# Patient Record
(demographics unavailable — no encounter records)

---

## 2025-04-02 NOTE — PHYSICAL EXAM
[Well Developed] : well developed [Well Nourished] : well nourished [No Acute Distress] : no acute distress [Normal Conjunctiva] : normal conjunctiva [Normal Venous Pressure] : normal venous pressure [No Carotid Bruit] : no carotid bruit [Normal S1, S2] : normal S1, S2 [No Murmur] : no murmur [No Rub] : no rub [No Gallop] : no gallop [Clear Lung Fields] : clear lung fields [Good Air Entry] : good air entry [No Respiratory Distress] : no respiratory distress  [No Masses/organomegaly] : no masses/organomegaly [No Edema] : no edema [No Cyanosis] : no cyanosis [No Clubbing] : no clubbing [Moves all extremities] : moves all extremities [No Focal Deficits] : no focal deficits [Normal Speech] : normal speech [Alert and Oriented] : alert and oriented [Normal memory] : normal memory

## 2025-04-02 NOTE — ASSESSMENT
[FreeTextEntry1] : 38 yo male with HIV (on Truvada) and intermittent chest discomfort over the past 4 months, which he describes as pressure-like and occurs randomly.  ECG today demonstrated sinus rhythm.  Given intermittent non-exertional CP, will perform echocardiogram to assess LV function and structural heart disease. After review of echo results, will determine if further cardiac work-up or intervention is clinically indicated. Will defer stress test at this time given lack of exertional complaints.

## 2025-04-02 NOTE — HISTORY OF PRESENT ILLNESS
[FreeTextEntry1] : 36 yo male with HIV (on Truvada), who presents today for cardiac evaluation of intermittent chest discomfort over the past 4 months, which he describes as pressure-like and occurs randomly. The chest pain last up to 5 minutes before resolving. He does not exercise regularly. Patient denies dyspnea, palpitations, syncope, edema, melena, hematochezia, or hematemesis.

## 2025-04-02 NOTE — REVIEW OF SYSTEMS
[Chest Discomfort] : chest discomfort [Negative] : Heme/Lymph [Dyspnea on exertion] : not dyspnea during exertion [Lower Ext Edema] : no extremity edema [Leg Claudication] : no intermittent leg claudication [Palpitations] : no palpitations [Orthopnea] : no orthopnea [Syncope] : no syncope

## 2025-04-02 NOTE — ASSESSMENT
Called patient no answer left message asking him to call the office back asap in reference to his appt with Dr. Latoya Carrion at 11:30 AM today. [FreeTextEntry1] : 36 yo male with HIV (on Truvada) and intermittent chest discomfort over the past 4 months, which he describes as pressure-like and occurs randomly.  ECG today demonstrated sinus rhythm.  Given intermittent non-exertional CP, will perform echocardiogram to assess LV function and structural heart disease. After review of echo results, will determine if further cardiac work-up or intervention is clinically indicated. Will defer stress test at this time given lack of exertional complaints.

## 2025-04-23 NOTE — PHYSICAL EXAM
[Alert] : alert [Sclera] : the sclera and conjunctiva were normal [No Respiratory Distress] : no respiratory distress [None] : no edema [Abdomen Soft] : soft [Abnormal Walk] : normal gait [Normal Color / Pigmentation] : normal skin color and pigmentation [No Focal Deficits] : no focal deficits [Oriented To Time, Place, And Person] : oriented to person, place, and time

## 2025-04-23 NOTE — CONSULT LETTER
[Dear  ___] : Dear  [unfilled], [Consult Letter:] : I had the pleasure of evaluating your patient, [unfilled]. [Please see my note below.] : Please see my note below. [Consult Closing:] : Thank you very much for allowing me to participate in the care of this patient.  If you have any questions, please do not hesitate to contact me. [Sincerely,] : Sincerely, [FreeTextEntry3] : Trevon Chacko MD tel: [phone] fax: [phone]

## 2025-04-23 NOTE — HISTORY OF PRESENT ILLNESS
[FreeTextEntry1] : SPENSER MARIE  is being evaluated at the request of Dr. Berger  for an opinion re: epigastric pain. Relieved with prn Omeprazole.  Denies nausea, vomiting, fever, chills, diarrhea, constipation, melena, hematemesis, BRBPR  Saw SAL Kelly in 10/2023 for epigastric pain. Stool sent for H. pylori was positive ( unclear if patient was treated) .  Sonogram was c/w fatty liver. Labs notable for isolated Alk phos elevation to 143

## 2025-04-23 NOTE — ASSESSMENT
[FreeTextEntry1] : Epigastric pain / history of H. pylori:  EGD planned. Omeprazole prescribed  Pertinent available records reviewed Risks of the procedure including but not limited to bleeding / perforation / infection / anesthesia complication / missed polyp or lesion explained to the  patient . The patient expressed understanding and a desire to proceed with the procedure.  Risk of not doing procedure includes but is not limited to missed or delayed diagnosis of gastrointestinal pathology.

## 2025-04-23 NOTE — CONSULT LETTER
[Dear  ___] : Dear  [unfilled], [Consult Letter:] : I had the pleasure of evaluating your patient, [unfilled]. [Please see my note below.] : Please see my note below. [Consult Closing:] : Thank you very much for allowing me to participate in the care of this patient.  If you have any questions, please do not hesitate to contact me. [Sincerely,] : Sincerely, [FreeTextEntry3] : Trevon Chacko MD tel: [phone] fax: [phone]

## 2025-04-23 NOTE — PHYSICAL EXAM
Spoke w/ pt mom and offered appt for today. Mom accepted    [Alert] : alert [Sclera] : the sclera and conjunctiva were normal [No Respiratory Distress] : no respiratory distress [None] : no edema [Abdomen Soft] : soft [Abnormal Walk] : normal gait [Normal Color / Pigmentation] : normal skin color and pigmentation [No Focal Deficits] : no focal deficits [Oriented To Time, Place, And Person] : oriented to person, place, and time

## 2025-05-05 NOTE — PHYSICAL EXAM
[FreeTextEntry1] : Physical examination  General: No acute distress, Awake, Alert.   Mental status  Awake, alert, and oriented to person, time and place, Normal attention span and concentration, Recent and remote memory intact, Language intact, Fund of knowledge intact.  MMSE:  Cranial Nerves  II: VFF  III, IV, VI: PERRL, EOMI.  V: Facial sensation is normal B/L.  VII: Facial strength is normal B/L.  VIII: Gross hearing is intact.  IX, X: Palate is midline and elevates symmetrically.  XI: Trapezius normal strength.  XII: Tongue midline without atrophy or fasciculations.   Motor exam  Muscle tone - no evidence of rigidity or resistance in all 4 extremities.  No atrophy or fasciculations  Muscle Strength: arms and legs, proximal and distal flexors and extensors are normal  No UE drift.  Reflexes  All present, normal, and symmetrical.  Plantars right: mute.  Plantars left: mute.   Coordination  Finger to nose: Normal.  Heel to shin: Normal.   Sensory  Intact sensation to vibration and cold.  Gait  Normal

## 2025-05-05 NOTE — HISTORY OF PRESENT ILLNESS
[FreeTextEntry1] : Tyler Ramon is a 37 year old man with a pertinent medical history of ,presenting today for initial evaluation for memory complaints  Onset and Duration:  When did you first notice the cognitive difficulties?    Have the symptoms been gradual or sudden?  Subjective cognitive decline: Do you feel like your memory has become worse? Do you have trouble remembering recent events or conversations? Do you find it difficult to concentrate or stay focused? Have you noticed any changes in your ability to plan or organize tasks?  Daily functioning:  Are you experiencing difficulties with daily activities such as managing finances, cooking, or driving?  Behavioral: Have you noticed any changes in your mood, personality, or behavior?  Can a family member or caregiver describe any changes they have observed in your cognitive function?  Medical hx and risk factors: Do you have a history of cardiovascular disease, diabetes, or depression? Do you have any family history of dementia or neurological conditions? Hx of head injuries or trauma?  Have there been any recent changes in your medications?  Medical history: Surgical history: Family history: Social: Denies smoking/vaping. Denies illicit drug use. ALCOHOL consumption? hx of abuse? SANDY: Snoring, dry mouth, HA in the morning, daytime somnolence  Exercise: Medications: Supplements:  Labs/Radiology:

## 2025-05-13 NOTE — REVIEW OF SYSTEMS
[Chest Discomfort] : chest discomfort [Negative] : Heme/Lymph [Palpitations] : palpitations [Dyspnea on exertion] : not dyspnea during exertion [Lower Ext Edema] : no extremity edema [Leg Claudication] : no intermittent leg claudication [Orthopnea] : no orthopnea [Syncope] : no syncope

## 2025-05-13 NOTE — ASSESSMENT
[FreeTextEntry1] : 38 yo male with HIV (on Truvada) and intermittent non-exertional chest discomfort and occasional palpitations.  Echo on 5/9/25 demonstrated normal LV size and systolic/diastolic function, LVEF 65%, normal RV size and systolic function, and trace MR/TR/CT. ECG today demonstrated sinus rhythm.  Given unremarkable echo on 5/9/25 and non-exertional CP, patient was given reassurance that his discomfort was not due to ischemic heart disease and more likely musculoskeletal given nature of his symptoms.  Will defer stress testing at this time given patient's age and absence of exertional complaints.  Patient with intermittent palpitations. Patient was offered further evaluation with Zio XT monitor but declined at this time and will call if wants to proceed.

## 2025-05-13 NOTE — CARDIOLOGY SUMMARY
[de-identified] : 5/13/25 ECG: Sinus rhythm, rate 62 bpm 4/1/25 ECG: Sinus rhythm, rate 60 bpm [de-identified] : 5/9/25 Echo: Normal LV size and systolic/diastolic function, LVEF 65%. Normal RV size and systolic function. Trace MR/TR/VA.

## 2025-05-13 NOTE — ASSESSMENT
[FreeTextEntry1] : 38 yo male with HIV (on Truvada) and intermittent non-exertional chest discomfort and occasional palpitations.  Echo on 5/9/25 demonstrated normal LV size and systolic/diastolic function, LVEF 65%, normal RV size and systolic function, and trace MR/TR/WV. ECG today demonstrated sinus rhythm.  Given unremarkable echo on 5/9/25 and non-exertional CP, patient was given reassurance that his discomfort was not due to ischemic heart disease and more likely musculoskeletal given nature of his symptoms.  Will defer stress testing at this time given patient's age and absence of exertional complaints.  Patient with intermittent palpitations. Patient was offered further evaluation with Zio XT monitor but declined at this time and will call if wants to proceed.

## 2025-05-13 NOTE — CARDIOLOGY SUMMARY
[de-identified] : 5/13/25 ECG: Sinus rhythm, rate 62 bpm 4/1/25 ECG: Sinus rhythm, rate 60 bpm [de-identified] : 5/9/25 Echo: Normal LV size and systolic/diastolic function, LVEF 65%. Normal RV size and systolic function. Trace MR/TR/MI.

## 2025-05-13 NOTE — HISTORY OF PRESENT ILLNESS
[FreeTextEntry1] : 38 yo male with HIV (on Truvada), who presents today for follow-up. He continues to report occasional non-exertional left sided chest discomfort. He also reports occasional palpitations. He does not exercise regularly. He drinks 1 cup of coffee daily. He denies any exertional complaints.